# Patient Record
Sex: FEMALE | URBAN - METROPOLITAN AREA
[De-identification: names, ages, dates, MRNs, and addresses within clinical notes are randomized per-mention and may not be internally consistent; named-entity substitution may affect disease eponyms.]

---

## 2017-07-05 DIAGNOSIS — Z11.1 SCREENING EXAMINATION FOR PULMONARY TUBERCULOSIS: Primary | ICD-10-CM

## 2017-07-06 LAB
M TB TUBERC IFN-G BLD QL: NEGATIVE
M TB TUBERC IFN-G/MITOGEN IGNF BLD: 0 IU/ML

## 2017-10-19 ENCOUNTER — APPOINTMENT (OUTPATIENT)
Dept: GENERAL RADIOLOGY | Facility: CLINIC | Age: 42
End: 2017-10-19
Attending: FAMILY MEDICINE
Payer: COMMERCIAL

## 2017-10-19 ENCOUNTER — APPOINTMENT (OUTPATIENT)
Dept: CARDIOLOGY | Facility: CLINIC | Age: 42
End: 2017-10-19
Attending: FAMILY MEDICINE
Payer: COMMERCIAL

## 2017-10-19 ENCOUNTER — HOSPITAL ENCOUNTER (EMERGENCY)
Facility: CLINIC | Age: 42
Discharge: HOME | End: 2017-10-19
Attending: FAMILY MEDICINE | Admitting: FAMILY MEDICINE
Payer: COMMERCIAL

## 2017-10-19 VITALS
RESPIRATION RATE: 16 BRPM | SYSTOLIC BLOOD PRESSURE: 94 MMHG | DIASTOLIC BLOOD PRESSURE: 60 MMHG | OXYGEN SATURATION: 97 %

## 2017-10-19 DIAGNOSIS — R07.89 ATYPICAL CHEST PAIN: ICD-10-CM

## 2017-10-19 LAB
ALBUMIN SERPL-MCNC: 3.9 G/DL (ref 3.4–5)
ALBUMIN UR-MCNC: NEGATIVE MG/DL
ALP SERPL-CCNC: 48 U/L (ref 40–150)
ALT SERPL W P-5'-P-CCNC: 22 U/L (ref 0–50)
ANION GAP SERPL CALCULATED.3IONS-SCNC: 4 MMOL/L (ref 3–14)
APPEARANCE UR: CLEAR
APTT PPP: 26 SEC (ref 22–37)
AST SERPL W P-5'-P-CCNC: 12 U/L (ref 0–45)
BASOPHILS # BLD AUTO: 0 10E9/L (ref 0–0.2)
BASOPHILS NFR BLD AUTO: 0.3 %
BILIRUB SERPL-MCNC: 0.8 MG/DL (ref 0.2–1.3)
BILIRUB UR QL STRIP: NEGATIVE
BUN SERPL-MCNC: 12 MG/DL (ref 7–30)
CALCIUM SERPL-MCNC: 8.9 MG/DL (ref 8.5–10.1)
CHLORIDE SERPL-SCNC: 104 MMOL/L (ref 94–109)
CO2 SERPL-SCNC: 32 MMOL/L (ref 20–32)
COLOR UR AUTO: NORMAL
CREAT SERPL-MCNC: 0.77 MG/DL (ref 0.52–1.04)
DIFFERENTIAL METHOD BLD: NORMAL
EOSINOPHIL # BLD AUTO: 0.1 10E9/L (ref 0–0.7)
EOSINOPHIL NFR BLD AUTO: 1.4 %
ERYTHROCYTE [DISTWIDTH] IN BLOOD BY AUTOMATED COUNT: 12 % (ref 10–15)
GFR SERPL CREATININE-BSD FRML MDRD: 83 ML/MIN/1.7M2
GLUCOSE SERPL-MCNC: 107 MG/DL (ref 70–99)
GLUCOSE UR STRIP-MCNC: NEGATIVE MG/DL
HCG UR QL: NEGATIVE
HCT VFR BLD AUTO: 40.8 % (ref 35–47)
HGB BLD-MCNC: 14.4 G/DL (ref 11.7–15.7)
HGB UR QL STRIP: NEGATIVE
IMM GRANULOCYTES # BLD: 0 10E9/L (ref 0–0.4)
IMM GRANULOCYTES NFR BLD: 0.1 %
INR PPP: 0.95 (ref 0.86–1.14)
INTERPRETATION ECG - MUSE: NORMAL
KETONES UR STRIP-MCNC: NEGATIVE MG/DL
LEUKOCYTE ESTERASE UR QL STRIP: NEGATIVE
LIPASE SERPL-CCNC: 155 U/L (ref 73–393)
LYMPHOCYTES # BLD AUTO: 2.2 10E9/L (ref 0.8–5.3)
LYMPHOCYTES NFR BLD AUTO: 32.1 %
MCH RBC QN AUTO: 30 PG (ref 26.5–33)
MCHC RBC AUTO-ENTMCNC: 35.3 G/DL (ref 31.5–36.5)
MCV RBC AUTO: 85 FL (ref 78–100)
MONOCYTES # BLD AUTO: 0.6 10E9/L (ref 0–1.3)
MONOCYTES NFR BLD AUTO: 8.5 %
NEUTROPHILS # BLD AUTO: 4 10E9/L (ref 1.6–8.3)
NEUTROPHILS NFR BLD AUTO: 57.6 %
NITRATE UR QL: NEGATIVE
NRBC # BLD AUTO: 0 10*3/UL
NRBC BLD AUTO-RTO: 0 /100
NT-PROBNP SERPL-MCNC: 24 PG/ML (ref 0–450)
PH UR STRIP: 6 PH (ref 5–7)
PLATELET # BLD AUTO: 379 10E9/L (ref 150–450)
POTASSIUM SERPL-SCNC: 4.1 MMOL/L (ref 3.4–5.3)
PROT SERPL-MCNC: 7.1 G/DL (ref 6.8–8.8)
RBC # BLD AUTO: 4.8 10E12/L (ref 3.8–5.2)
SODIUM SERPL-SCNC: 140 MMOL/L (ref 133–144)
SOURCE: NORMAL
SP GR UR STRIP: 1.01 (ref 1–1.03)
TROPONIN I SERPL-MCNC: <0.015 UG/L (ref 0–0.04)
TROPONIN I SERPL-MCNC: <0.015 UG/L (ref 0–0.04)
UROBILINOGEN UR STRIP-MCNC: NORMAL MG/DL (ref 0–2)
WBC # BLD AUTO: 6.9 10E9/L (ref 4–11)

## 2017-10-19 PROCEDURE — 85730 THROMBOPLASTIN TIME PARTIAL: CPT | Performed by: FAMILY MEDICINE

## 2017-10-19 PROCEDURE — 99285 EMERGENCY DEPT VISIT HI MDM: CPT | Mod: 25 | Performed by: FAMILY MEDICINE

## 2017-10-19 PROCEDURE — 93306 TTE W/DOPPLER COMPLETE: CPT | Mod: 26 | Performed by: INTERNAL MEDICINE

## 2017-10-19 PROCEDURE — 71010 XR CHEST PORT 1 VW: CPT

## 2017-10-19 PROCEDURE — 93010 ELECTROCARDIOGRAM REPORT: CPT | Mod: Z6 | Performed by: FAMILY MEDICINE

## 2017-10-19 PROCEDURE — 96375 TX/PRO/DX INJ NEW DRUG ADDON: CPT | Performed by: FAMILY MEDICINE

## 2017-10-19 PROCEDURE — 93005 ELECTROCARDIOGRAM TRACING: CPT | Performed by: FAMILY MEDICINE

## 2017-10-19 PROCEDURE — 96374 THER/PROPH/DIAG INJ IV PUSH: CPT | Performed by: FAMILY MEDICINE

## 2017-10-19 PROCEDURE — 83880 ASSAY OF NATRIURETIC PEPTIDE: CPT | Performed by: FAMILY MEDICINE

## 2017-10-19 PROCEDURE — 99284 EMERGENCY DEPT VISIT MOD MDM: CPT | Mod: 25 | Performed by: FAMILY MEDICINE

## 2017-10-19 PROCEDURE — 25000132 ZZH RX MED GY IP 250 OP 250 PS 637: Performed by: FAMILY MEDICINE

## 2017-10-19 PROCEDURE — 96361 HYDRATE IV INFUSION ADD-ON: CPT | Performed by: FAMILY MEDICINE

## 2017-10-19 PROCEDURE — 80053 COMPREHEN METABOLIC PANEL: CPT | Performed by: FAMILY MEDICINE

## 2017-10-19 PROCEDURE — 83690 ASSAY OF LIPASE: CPT | Performed by: FAMILY MEDICINE

## 2017-10-19 PROCEDURE — 93005 ELECTROCARDIOGRAM TRACING: CPT | Mod: 76 | Performed by: FAMILY MEDICINE

## 2017-10-19 PROCEDURE — 25000128 H RX IP 250 OP 636: Performed by: FAMILY MEDICINE

## 2017-10-19 PROCEDURE — 25000132 ZZH RX MED GY IP 250 OP 250 PS 637

## 2017-10-19 PROCEDURE — 84484 ASSAY OF TROPONIN QUANT: CPT | Performed by: FAMILY MEDICINE

## 2017-10-19 PROCEDURE — 85610 PROTHROMBIN TIME: CPT | Performed by: FAMILY MEDICINE

## 2017-10-19 PROCEDURE — 93306 TTE W/DOPPLER COMPLETE: CPT

## 2017-10-19 PROCEDURE — 81003 URINALYSIS AUTO W/O SCOPE: CPT | Performed by: FAMILY MEDICINE

## 2017-10-19 PROCEDURE — 81025 URINE PREGNANCY TEST: CPT | Performed by: FAMILY MEDICINE

## 2017-10-19 PROCEDURE — 85025 COMPLETE CBC W/AUTO DIFF WBC: CPT | Performed by: FAMILY MEDICINE

## 2017-10-19 RX ORDER — ACETAMINOPHEN 500 MG
1000 TABLET ORAL ONCE
Status: COMPLETED | OUTPATIENT
Start: 2017-10-19 | End: 2017-10-19

## 2017-10-19 RX ORDER — ACETAMINOPHEN 500 MG
TABLET ORAL
Status: COMPLETED
Start: 2017-10-19 | End: 2017-10-19

## 2017-10-19 RX ORDER — ONDANSETRON 2 MG/ML
4 INJECTION INTRAMUSCULAR; INTRAVENOUS
Status: COMPLETED | OUTPATIENT
Start: 2017-10-19 | End: 2017-10-19

## 2017-10-19 RX ORDER — ASPIRIN 81 MG/1
162 TABLET, CHEWABLE ORAL ONCE
Status: COMPLETED | OUTPATIENT
Start: 2017-10-19 | End: 2017-10-19

## 2017-10-19 RX ORDER — LIDOCAINE 40 MG/G
CREAM TOPICAL
Status: DISCONTINUED | OUTPATIENT
Start: 2017-10-19 | End: 2017-10-19 | Stop reason: HOSPADM

## 2017-10-19 RX ORDER — KETOROLAC TROMETHAMINE 30 MG/ML
15 INJECTION, SOLUTION INTRAMUSCULAR; INTRAVENOUS ONCE
Status: COMPLETED | OUTPATIENT
Start: 2017-10-19 | End: 2017-10-19

## 2017-10-19 RX ORDER — SODIUM CHLORIDE 9 MG/ML
1000 INJECTION, SOLUTION INTRAVENOUS CONTINUOUS
Status: DISCONTINUED | OUTPATIENT
Start: 2017-10-19 | End: 2017-10-19 | Stop reason: HOSPADM

## 2017-10-19 RX ADMIN — ONDANSETRON 4 MG: 2 INJECTION INTRAMUSCULAR; INTRAVENOUS at 08:19

## 2017-10-19 RX ADMIN — ASPIRIN 81 MG CHEWABLE TABLET 162 MG: 81 TABLET CHEWABLE at 08:18

## 2017-10-19 RX ADMIN — KETOROLAC TROMETHAMINE 15 MG: 30 INJECTION, SOLUTION INTRAMUSCULAR at 09:08

## 2017-10-19 RX ADMIN — Medication 1000 MG: at 08:19

## 2017-10-19 RX ADMIN — ACETAMINOPHEN 1000 MG: 500 TABLET ORAL at 08:19

## 2017-10-19 RX ADMIN — SODIUM CHLORIDE 1000 ML: 9 INJECTION, SOLUTION INTRAVENOUS at 08:21

## 2017-10-19 ASSESSMENT — ENCOUNTER SYMPTOMS
DIFFICULTY URINATING: 0
DYSPHORIC MOOD: 0
WEAKNESS: 1
NAUSEA: 1
FEVER: 0
FATIGUE: 1
BACK PAIN: 0
ABDOMINAL PAIN: 0
EYE REDNESS: 0
ACTIVITY CHANGE: 1
DECREASED CONCENTRATION: 0
COUGH: 0
COLOR CHANGE: 0
WOUND: 0
BRUISES/BLEEDS EASILY: 0
VOMITING: 0
NECK STIFFNESS: 0
CONFUSION: 0
SHORTNESS OF BREATH: 0
HEADACHES: 1
ARTHRALGIAS: 0

## 2017-10-19 NOTE — ED PROVIDER NOTES
History     Chief Complaint   Patient presents with     Chest Pain     pain/ chest tightness started while driving, nausea, radiated to left shoulder and arm     HPI  Lali Morrow is a 42 year old female who just emergency room with chest pressure with left arm discomfort.  She was known to myself.  She is a resident here at the [facility].  Patient no history of heart disease etc.  Driving and also developed a tightness in her chest with some left shoulder discomfort.  No shortness of breath but did have nausea.  No abdominal pain no back pain.  No fevers or chills no leg pain or leg swelling now just feels weak but other symptoms are gone.  Symptoms lasted approximately 5 minutes.  He noted that she's had a couple episodes in the past and been very brief several been evaluated for these.  Family history mother history of heart issues.  Patient otherwise no smoking no hypertension diabetes cholesterol patient only with thyroid issues.    I have reviewed the Medications, Allergies, Past Medical and Surgical History, and Social History in the Epic system.    Review of Systems   Constitutional: Positive for activity change and fatigue. Negative for fever.   HENT: Negative for congestion.    Eyes: Negative for redness.   Respiratory: Negative for cough and shortness of breath.    Cardiovascular: Negative for chest pain and leg swelling.   Gastrointestinal: Positive for nausea. Negative for abdominal pain and vomiting.   Genitourinary: Negative for difficulty urinating.   Musculoskeletal: Negative for arthralgias, back pain and neck stiffness.   Skin: Negative for color change, rash and wound.   Allergic/Immunologic: Negative for immunocompromised state.   Neurological: Positive for weakness (mild general) and headaches. Negative for syncope.   Hematological: Does not bruise/bleed easily.   Psychiatric/Behavioral: Negative for confusion, decreased concentration and dysphoric mood.   All other systems reviewed  and are negative.      Physical Exam   BP: 116/74  Heart Rate: 84  Resp: 20  SpO2: 98 %      Physical Exam   Constitutional: She is oriented to person, place, and time. She appears well-developed and well-nourished. She appears distressed.   Patient  no chest pain.  Just feeling weak.   HENT:   Head: Normocephalic and atraumatic.   Eyes: EOM are normal. Pupils are equal, round, and reactive to light. No scleral icterus.   Neck: Normal range of motion. Neck supple. No JVD present.   Cardiovascular: Normal rate and regular rhythm.    Pulmonary/Chest: No stridor. No respiratory distress.   Abdominal: She exhibits no distension. There is no tenderness. There is no rebound and no guarding.   Musculoskeletal: She exhibits no edema, tenderness or deformity.   Negative Homans   Neurological: She is alert and oriented to person, place, and time. She has normal reflexes. No cranial nerve deficit. Coordination normal.   Skin: Skin is warm and dry. No rash noted. She is not diaphoretic. No erythema. No pallor.   Psychiatric: She has a normal mood and affect. Her behavior is normal. Judgment and thought content normal.   Nursing note and vitals reviewed.      ED Course     ED Course     Patient evaluated in ER quickly.  EKG shows sinus rhythm there is just some subtle ST changes inferior that aren't hyperacute.  Patient given 2 baby aspirin and Tylenol orally.  Chest x-ray done was normal.  Patient just states she feels weak but having no chest discomfort.  Patient describes headache without neuro changes.  We'll give 15 mg of Toradol IV as she is request.  Chest x-ray done as noted was normal.  Troponin ×2 negative.  Patient feeling better.  Discussed as far as doing stress echo patient declines this we did do a bedside echo noted that appear to be normal.    I had cardiology fellow look at both EKGs as we did do a repeat one also there is no significant change service for cardiology.    Patient feels much better did eat here  also in the ER is symptom-free comfortable going home to follow-up with M.D.    Procedures             EKG Interpretation:      Interpreted by Jerrod Gonzales  Time reviewed: 800  Symptoms at time of EKG: chest pain   Rhythm: normal sinus   Rate: normal  Axis: normal  Ectopy: none  Conduction: normal  ST Segments/ T Waves: No hyperacute ST-T wave changes  Q Waves: none  Comparison to prior: No old EKG available    Clinical Impression: normal EKG            Critical Care time:  none           Labs Ordered and Resulted from Time of ED Arrival Up to the Time of Departure from the ED   COMPREHENSIVE METABOLIC PANEL - Abnormal; Notable for the following:        Result Value    Glucose 107 (*)     All other components within normal limits   CBC WITH PLATELETS DIFFERENTIAL   INR   PARTIAL THROMBOPLASTIN TIME   LIPASE   TROPONIN I   NT PROBNP INPATIENT   UA MACROSCOPIC WITH REFLEX TO MICRO AND CULTURE   HCG QUALITATIVE URINE   TROPONIN I   PULSE OXIMETRY NURSING   CARDIAC CONTINUOUS MONITORING   PERIPHERAL IV CATHETER     Results for orders placed or performed during the hospital encounter of 10/19/17   XR Chest Port 1 View    Narrative    XR CHEST PORT 1 VW 10/19/2017 8:32 AM    HISTORY: Chest pain.    COMPARISON: 10/18/2016    FINDINGS: No airspace consolidation, pleural effusion or pneumothorax.  Normal heart size.      Impression    IMPRESSION: No acute cardiopulmonary abnormality.    ANGY CALHOUN MD   CBC with platelets differential   Result Value Ref Range    WBC 6.9 4.0 - 11.0 10e9/L    RBC Count 4.80 3.8 - 5.2 10e12/L    Hemoglobin 14.4 11.7 - 15.7 g/dL    Hematocrit 40.8 35.0 - 47.0 %    MCV 85 78 - 100 fl    MCH 30.0 26.5 - 33.0 pg    MCHC 35.3 31.5 - 36.5 g/dL    RDW 12.0 10.0 - 15.0 %    Platelet Count 379 150 - 450 10e9/L    Diff Method Automated Method     % Neutrophils 57.6 %    % Lymphocytes 32.1 %    % Monocytes 8.5 %    % Eosinophils 1.4 %    % Basophils 0.3 %    % Immature Granulocytes 0.1 %     Nucleated RBCs 0 0 /100    Absolute Neutrophil 4.0 1.6 - 8.3 10e9/L    Absolute Lymphocytes 2.2 0.8 - 5.3 10e9/L    Absolute Monocytes 0.6 0.0 - 1.3 10e9/L    Absolute Eosinophils 0.1 0.0 - 0.7 10e9/L    Absolute Basophils 0.0 0.0 - 0.2 10e9/L    Abs Immature Granulocytes 0.0 0 - 0.4 10e9/L    Absolute Nucleated RBC 0.0    INR   Result Value Ref Range    INR 0.95 0.86 - 1.14   Partial thromboplastin time   Result Value Ref Range    PTT 26 22 - 37 sec   Comprehensive metabolic panel   Result Value Ref Range    Sodium 140 133 - 144 mmol/L    Potassium 4.1 3.4 - 5.3 mmol/L    Chloride 104 94 - 109 mmol/L    Carbon Dioxide 32 20 - 32 mmol/L    Anion Gap 4 3 - 14 mmol/L    Glucose 107 (H) 70 - 99 mg/dL    Urea Nitrogen 12 7 - 30 mg/dL    Creatinine 0.77 0.52 - 1.04 mg/dL    GFR Estimate 83 >60 mL/min/1.7m2    GFR Estimate If Black >90 >60 mL/min/1.7m2    Calcium 8.9 8.5 - 10.1 mg/dL    Bilirubin Total 0.8 0.2 - 1.3 mg/dL    Albumin 3.9 3.4 - 5.0 g/dL    Protein Total 7.1 6.8 - 8.8 g/dL    Alkaline Phosphatase 48 40 - 150 U/L    ALT 22 0 - 50 U/L    AST 12 0 - 45 U/L   Lipase   Result Value Ref Range    Lipase 155 73 - 393 U/L   Troponin I   Result Value Ref Range    Troponin I ES <0.015 0.000 - 0.045 ug/L   Nt probnp inpatient (BNP)   Result Value Ref Range    N-Terminal Pro BNP Inpatient 24 0 - 450 pg/mL   UA reflex to Microscopic and Culture   Result Value Ref Range    Color Urine Light Yellow     Appearance Urine Clear     Glucose Urine Negative NEG^Negative mg/dL    Bilirubin Urine Negative NEG^Negative    Ketones Urine Negative NEG^Negative mg/dL    Specific Gravity Urine 1.006 1.003 - 1.035    Blood Urine Negative NEG^Negative    pH Urine 6.0 5.0 - 7.0 pH    Protein Albumin Urine Negative NEG^Negative mg/dL    Urobilinogen mg/dL Normal 0.0 - 2.0 mg/dL    Nitrite Urine Negative NEG^Negative    Leukocyte Esterase Urine Negative NEG^Negative    Source Midstream Urine    HCG qualitative urine   Result Value Ref Range     HCG Qual Urine Negative NEG^Negative   Troponin I   Result Value Ref Range    Troponin I ES <0.015 0.000 - 0.045 ug/L   EKG 12 lead   Result Value Ref Range    Interpretation ECG Click View Image link to view waveform and result    Echocardiogram Complete    Narrative    [id number]  ECH19  [id number]  [id number]^EREN^DIXIE^PEDRO LUIS           [facility],[place]  Echocardiography Laboratory  [address] [place] [address]     Name: RICARDO LEMA  MRN: [id number]  : 1975  Study Date: 10/19/2017 09:15 AM  Age: 42 yrs  Gender: Female  Patient Location: [facility]  Reason For Study: Chest Discomfort  Ordering Physician: DIXIE JASON  Performed By: Margo Layton     BSA: 1.6 m2  Height: 61 in  Weight: 136 lb  BP: 130/77 mmHg  _____________________________________________________________________________  __        Procedure  Complete Portable Echo Adult.  _____________________________________________________________________________  __        Interpretation Summary  Left ventricular size is normal. Global and regional left ventricular function  is normal with an EF of 60-65%.  The right ventricle is normal size. Global right ventricular function is  normal.  The inferior vena cava is normal. Estimated mean right atrial pressure is 3  mmHg.  No pericardial effusion is present.  Previous study not available for comparison.  _____________________________________________________________________________  __        Left Ventricle  Left ventricular size is normal. Left ventricular wall thickness is normal.  Global and regional left ventricular function is normal with an EF of 60-65%.  Normal left ventricular filling for age. No regional wall motion abnormalities  are seen.     Right Ventricle  The right ventricle is normal size. Global right ventricular function is  normal.     Atria  Both atria appear normal. The atrial septum is intact as assessed by color  Doppler .        Mitral  Valve  The mitral valve is normal. Trace mitral insufficiency is present.     Aortic Valve  Aortic valve is normal in structure and function.     Tricuspid Valve  The tricuspid valve is normal. Trace tricuspid insufficiency is present.  Pulmonary artery systolic pressure cannot be assessed. The peak velocity of  the tricuspid regurgitant jet is not obtainable.     Pulmonic Valve  The pulmonic valve is normal.     Vessels  The aorta root is normal. The inferior vena cava is normal. Estimated mean  right atrial pressure is 3 mmHg.     Pericardium  No pericardial effusion is present.        Compared to Previous Study  Previous study not available for comparison.  _____________________________________________________________________________  __     MMode/2D Measurements & Calculations  IVSd: 0.79 cm  LVIDd: 4.5 cm  LVIDs: 3.0 cm  LVPWd: 0.76 cm  FS: 33.4 %  EDV(Teich): 93.2 ml  ESV(Teich): 35.3 ml  LV mass(C)d: 109.8 grams  LV mass(C)dI: 68.5 grams/m2  Ao root diam: 2.3 cm  asc Aorta Diam: 2.8 cm  LVOT diam: 1.7 cm  LVOT area: 2.3 cm2  LA Volume (BP): 40.0 ml  LA Volume Index (BP): 25.0 ml/m2     TAPSE: 2.5 cm        Doppler Measurements & Calculations  MV E max raquel: 75.5 cm/sec  MV A max raquel: 50.3 cm/sec  MV E/A: 1.5  MV dec time: 0.23 sec  Lateral E/e': 5.7  Med E to E': 6.1  Medial E/e': 6.1     _____________________________________________________________________________  __           Report approved by: Kimber Alarcon 10/19/2017 10:41 AM                 Assessments & Plan (with Medical Decision Making)  42-year-old female known to myself presented with a brief 5-10 minute episode of chest pain with some left arm discomfort.  Patient feeling generally weak was evaluated here in the ER laboratory testing including troponin ×2 are negative.  Bedside echocardiogram was normal also.  EKGs ×2 reviewed by cardiology fellow field no significant findings noted.  She declines stress echo is okay going home following up  with M.D.  Currently symptom-free feels much better.             I have reviewed the nursing notes.    I have reviewed the findings, diagnosis, plan and need for follow up with the patient.    Discharge Medication List as of 10/19/2017 12:04 PM          Final diagnoses:   Atypical chest pain       10/19/2017   [facility], [place], EMERGENCY DEPARTMENT    This note was created at least in part by the use of dragon voice dictation system. Inadvertent typographical errors may still exist.  Jerrod Gonzales MD.         Jerrod Gonzales MD  10/19/17 [id number]

## 2017-10-19 NOTE — DISCHARGE INSTRUCTIONS
Home.  Rest today.  Your ekg's and echo and labs look good.  Monitor symptoms.  See MD for follow up.  Return if any concerns.  Results for orders placed or performed during the hospital encounter of 10/19/17   XR Chest Port 1 View    Narrative    XR CHEST PORT 1 VW 10/19/2017 8:32 AM    HISTORY: Chest pain.    COMPARISON: 10/18/2016    FINDINGS: No airspace consolidation, pleural effusion or pneumothorax.  Normal heart size.      Impression    IMPRESSION: No acute cardiopulmonary abnormality.    ANGY CALHOUN MD   CBC with platelets differential   Result Value Ref Range    WBC 6.9 4.0 - 11.0 10e9/L    RBC Count 4.80 3.8 - 5.2 10e12/L    Hemoglobin 14.4 11.7 - 15.7 g/dL    Hematocrit 40.8 35.0 - 47.0 %    MCV 85 78 - 100 fl    MCH 30.0 26.5 - 33.0 pg    MCHC 35.3 31.5 - 36.5 g/dL    RDW 12.0 10.0 - 15.0 %    Platelet Count 379 150 - 450 10e9/L    Diff Method Automated Method     % Neutrophils 57.6 %    % Lymphocytes 32.1 %    % Monocytes 8.5 %    % Eosinophils 1.4 %    % Basophils 0.3 %    % Immature Granulocytes 0.1 %    Nucleated RBCs 0 0 /100    Absolute Neutrophil 4.0 1.6 - 8.3 10e9/L    Absolute Lymphocytes 2.2 0.8 - 5.3 10e9/L    Absolute Monocytes 0.6 0.0 - 1.3 10e9/L    Absolute Eosinophils 0.1 0.0 - 0.7 10e9/L    Absolute Basophils 0.0 0.0 - 0.2 10e9/L    Abs Immature Granulocytes 0.0 0 - 0.4 10e9/L    Absolute Nucleated RBC 0.0    INR   Result Value Ref Range    INR 0.95 0.86 - 1.14   Partial thromboplastin time   Result Value Ref Range    PTT 26 22 - 37 sec   Comprehensive metabolic panel   Result Value Ref Range    Sodium 140 133 - 144 mmol/L    Potassium 4.1 3.4 - 5.3 mmol/L    Chloride 104 94 - 109 mmol/L    Carbon Dioxide 32 20 - 32 mmol/L    Anion Gap 4 3 - 14 mmol/L    Glucose 107 (H) 70 - 99 mg/dL    Urea Nitrogen 12 7 - 30 mg/dL    Creatinine 0.77 0.52 - 1.04 mg/dL    GFR Estimate 83 >60 mL/min/1.7m2    GFR Estimate If Black >90 >60 mL/min/1.7m2    Calcium 8.9 8.5 - 10.1 mg/dL    Bilirubin Total  0.8 0.2 - 1.3 mg/dL    Albumin 3.9 3.4 - 5.0 g/dL    Protein Total 7.1 6.8 - 8.8 g/dL    Alkaline Phosphatase 48 40 - 150 U/L    ALT 22 0 - 50 U/L    AST 12 0 - 45 U/L   Lipase   Result Value Ref Range    Lipase 155 73 - 393 U/L   Troponin I   Result Value Ref Range    Troponin I ES <0.015 0.000 - 0.045 ug/L   Nt probnp inpatient (BNP)   Result Value Ref Range    N-Terminal Pro BNP Inpatient 24 0 - 450 pg/mL   UA reflex to Microscopic and Culture   Result Value Ref Range    Color Urine Light Yellow     Appearance Urine Clear     Glucose Urine Negative NEG^Negative mg/dL    Bilirubin Urine Negative NEG^Negative    Ketones Urine Negative NEG^Negative mg/dL    Specific Gravity Urine 1.006 1.003 - 1.035    Blood Urine Negative NEG^Negative    pH Urine 6.0 5.0 - 7.0 pH    Protein Albumin Urine Negative NEG^Negative mg/dL    Urobilinogen mg/dL Normal 0.0 - 2.0 mg/dL    Nitrite Urine Negative NEG^Negative    Leukocyte Esterase Urine Negative NEG^Negative    Source Midstream Urine    HCG qualitative urine   Result Value Ref Range    HCG Qual Urine Negative NEG^Negative   Troponin I   Result Value Ref Range    Troponin I ES <0.015 0.000 - 0.045 ug/L   EKG 12 lead   Result Value Ref Range    Interpretation ECG Click View Image link to view waveform and result    Echocardiogram Complete    Narrative    [id number]  ECH19  [id number]  [id number]^EREN^DIXIE^PEDRO LUIS           [facility],[place]  Echocardiography Laboratory  [address] [place] [id number]     Name: RICARDO LEMA  MRN: [id number]  : 1975  Study Date: 10/19/2017 09:15 AM  Age: 42 yrs  Gender: Female  Patient Location: [facility], [place]  Reason For Study: Chest Discomfort  Ordering Physician: DIXIE JASON  Performed By: Margo Layton     BSA: 1.6 m2  Height: 61 in  Weight: 136 lb  BP: 130/77 mmHg  _____________________________________________________________________________  __        Procedure  Complete Portable Echo  Adult.  _____________________________________________________________________________  __        Interpretation Summary  Left ventricular size is normal. Global and regional left ventricular function  is normal with an EF of 60-65%.  The right ventricle is normal size. Global right ventricular function is  normal.  The inferior vena cava is normal. Estimated mean right atrial pressure is 3  mmHg.  No pericardial effusion is present.  Previous study not available for comparison.  _____________________________________________________________________________  __        Left Ventricle  Left ventricular size is normal. Left ventricular wall thickness is normal.  Global and regional left ventricular function is normal with an EF of 60-65%.  Normal left ventricular filling for age. No regional wall motion abnormalities  are seen.     Right Ventricle  The right ventricle is normal size. Global right ventricular function is  normal.     Atria  Both atria appear normal. The atrial septum is intact as assessed by color  Doppler .        Mitral Valve  The mitral valve is normal. Trace mitral insufficiency is present.     Aortic Valve  Aortic valve is normal in structure and function.     Tricuspid Valve  The tricuspid valve is normal. Trace tricuspid insufficiency is present.  Pulmonary artery systolic pressure cannot be assessed. The peak velocity of  the tricuspid regurgitant jet is not obtainable.     Pulmonic Valve  The pulmonic valve is normal.     Vessels  The aorta root is normal. The inferior vena cava is normal. Estimated mean  right atrial pressure is 3 mmHg.     Pericardium  No pericardial effusion is present.        Compared to Previous Study  Previous study not available for comparison.  _____________________________________________________________________________  __     MMode/2D Measurements & Calculations  IVSd: 0.79 cm  LVIDd: 4.5 cm  LVIDs: 3.0 cm  LVPWd: 0.76 cm  FS: 33.4 %  EDV(Teich): 93.2 ml  ESV(Teich):  35.3 ml  LV mass(C)d: 109.8 grams  LV mass(C)dI: 68.5 grams/m2  Ao root diam: 2.3 cm  asc Aorta Diam: 2.8 cm  LVOT diam: 1.7 cm  LVOT area: 2.3 cm2  LA Volume (BP): 40.0 ml  LA Volume Index (BP): 25.0 ml/m2     TAPSE: 2.5 cm        Doppler Measurements & Calculations  MV E max raquel: 75.5 cm/sec  MV A max raquel: 50.3 cm/sec  MV E/A: 1.5  MV dec time: 0.23 sec  Lateral E/e': 5.7  Med E to E': 6.1  Medial E/e': 6.1     _____________________________________________________________________________  __           Report approved by: Kimber Alarcon 10/19/2017 10:41 AM

## 2017-10-19 NOTE — ED AVS SNAPSHOT
[facility], [place], Emergency Department    [address]    [facility] [place] [address]    Phone:  [phone]    Fax:  [phone]                                       Lali Morrow   MRN: [id number]    Department:  [facility], Emergency Department   Date of Visit:  10/19/2017           After Visit Summary Signature Page     I have received my discharge instructions, and my questions have been answered. I have discussed any challenges I see with this plan with the nurse or doctor.    ..........................................................................................................................................  Patient/Patient Representative Signature      ..........................................................................................................................................  Patient Representative Print Name and Relationship to Patient    ..................................................               ................................................  Date                                            Time    ..........................................................................................................................................  Reviewed by Signature/Title    ...................................................              ..............................................  Date                                                            Time

## 2017-10-19 NOTE — ED AVS SNAPSHOT
[facility], Emergency Department    [address]    [facility] [place] [place] [address]    Phone:  [phone]    Fax:  [phone]                                       Lali Morrow   MRN: [id number]    Department:  [facility], Emergency Department   Date of Visit:  10/19/2017           Patient Information     Date Of Birth          1975        Your diagnoses for this visit were:     Atypical chest pain        You were seen by Jerrod Gonzales MD.      Follow-up Information     Follow up with Vanessa Moeller MD.    Specialty:  Family Practice    Contact information:    [address] [address] [phone]          Discharge Instructions       Home.  Rest today.  Your ekg's and echo and labs look good.  Monitor symptoms.  See MD for follow up.  Return if any concerns.  Results for orders placed or performed during the hospital encounter of 10/19/17   XR Chest Port 1 View    Narrative    XR CHEST PORT 1 VW 10/19/2017 8:32 AM    HISTORY: Chest pain.    COMPARISON: 10/18/2016    FINDINGS: No airspace consolidation, pleural effusion or pneumothorax.  Normal heart size.      Impression    IMPRESSION: No acute cardiopulmonary abnormality.    ANGY CALHOUN MD   CBC with platelets differential   Result Value Ref Range    WBC 6.9 4.0 - 11.0 10e9/L    RBC Count 4.80 3.8 - 5.2 10e12/L    Hemoglobin 14.4 11.7 - 15.7 g/dL    Hematocrit 40.8 35.0 - 47.0 %    MCV 85 78 - 100 fl    MCH 30.0 26.5 - 33.0 pg    MCHC 35.3 31.5 - 36.5 g/dL    RDW 12.0 10.0 - 15.0 %    Platelet Count 379 150 - 450 10e9/L    Diff Method Automated Method     % Neutrophils 57.6 %    % Lymphocytes 32.1 %    % Monocytes 8.5 %    % Eosinophils 1.4 %    % Basophils 0.3 %    % Immature Granulocytes 0.1 %    Nucleated RBCs 0 0 /100    Absolute Neutrophil 4.0 1.6 - 8.3 10e9/L    Absolute Lymphocytes 2.2 0.8 - 5.3 10e9/L    Absolute Monocytes 0.6 0.0 - 1.3 10e9/L    Absolute Eosinophils 0.1 0.0 - 0.7 10e9/L    Absolute  Basophils 0.0 0.0 - 0.2 10e9/L    Abs Immature Granulocytes 0.0 0 - 0.4 10e9/L    Absolute Nucleated RBC 0.0    INR   Result Value Ref Range    INR 0.95 0.86 - 1.14   Partial thromboplastin time   Result Value Ref Range    PTT 26 22 - 37 sec   Comprehensive metabolic panel   Result Value Ref Range    Sodium 140 133 - 144 mmol/L    Potassium 4.1 3.4 - 5.3 mmol/L    Chloride 104 94 - 109 mmol/L    Carbon Dioxide 32 20 - 32 mmol/L    Anion Gap 4 3 - 14 mmol/L    Glucose 107 (H) 70 - 99 mg/dL    Urea Nitrogen 12 7 - 30 mg/dL    Creatinine 0.77 0.52 - 1.04 mg/dL    GFR Estimate 83 >60 mL/min/1.7m2    GFR Estimate If Black >90 >60 mL/min/1.7m2    Calcium 8.9 8.5 - 10.1 mg/dL    Bilirubin Total 0.8 0.2 - 1.3 mg/dL    Albumin 3.9 3.4 - 5.0 g/dL    Protein Total 7.1 6.8 - 8.8 g/dL    Alkaline Phosphatase 48 40 - 150 U/L    ALT 22 0 - 50 U/L    AST 12 0 - 45 U/L   Lipase   Result Value Ref Range    Lipase 155 73 - 393 U/L   Troponin I   Result Value Ref Range    Troponin I ES <0.015 0.000 - 0.045 ug/L   Nt probnp inpatient (BNP)   Result Value Ref Range    N-Terminal Pro BNP Inpatient 24 0 - 450 pg/mL   UA reflex to Microscopic and Culture   Result Value Ref Range    Color Urine Light Yellow     Appearance Urine Clear     Glucose Urine Negative NEG^Negative mg/dL    Bilirubin Urine Negative NEG^Negative    Ketones Urine Negative NEG^Negative mg/dL    Specific Gravity Urine 1.006 1.003 - 1.035    Blood Urine Negative NEG^Negative    pH Urine 6.0 5.0 - 7.0 pH    Protein Albumin Urine Negative NEG^Negative mg/dL    Urobilinogen mg/dL Normal 0.0 - 2.0 mg/dL    Nitrite Urine Negative NEG^Negative    Leukocyte Esterase Urine Negative NEG^Negative    Source Midstream Urine    HCG qualitative urine   Result Value Ref Range    HCG Qual Urine Negative NEG^Negative   Troponin I   Result Value Ref Range    Troponin I ES <0.015 0.000 - 0.045 ug/L   EKG 12 lead   Result Value Ref Range    Interpretation ECG Click View Image link to view  waveform and result    Echocardiogram Complete    Narrative    [id number]  [facility]19  [id number]  [id number]^EREN^DIXIE^PEDRO LUIS           [facility]  Echocardiography Laboratory  [address] [place] [id number]     Name: RICARDO LEMA  MRN: [id number]  : 1975  Study Date: 10/19/2017 09:15 AM  Age: 42 yrs  Gender: Female  Patient Location: [facility]  Reason For Study: Chest Discomfort  Ordering Physician: DIXIE JASON  Performed By: Margo Layton     BSA: 1.6 m2  Height: 61 in  Weight: 136 lb  BP: 130/77 mmHg  _____________________________________________________________________________  __        Procedure  Complete Portable Echo Adult.  _____________________________________________________________________________  __        Interpretation Summary  Left ventricular size is normal. Global and regional left ventricular function  is normal with an EF of 60-65%.  The right ventricle is normal size. Global right ventricular function is  normal.  The inferior vena cava is normal. Estimated mean right atrial pressure is 3  mmHg.  No pericardial effusion is present.  Previous study not available for comparison.  _____________________________________________________________________________  __        Left Ventricle  Left ventricular size is normal. Left ventricular wall thickness is normal.  Global and regional left ventricular function is normal with an EF of 60-65%.  Normal left ventricular filling for age. No regional wall motion abnormalities  are seen.     Right Ventricle  The right ventricle is normal size. Global right ventricular function is  normal.     Atria  Both atria appear normal. The atrial septum is intact as assessed by color  Doppler .        Mitral Valve  The mitral valve is normal. Trace mitral insufficiency is present.     Aortic Valve  Aortic valve is normal in structure and function.     Tricuspid Valve  The tricuspid valve is normal. Trace tricuspid  insufficiency is present.  Pulmonary artery systolic pressure cannot be assessed. The peak velocity of  the tricuspid regurgitant jet is not obtainable.     Pulmonic Valve  The pulmonic valve is normal.     Vessels  The aorta root is normal. The inferior vena cava is normal. Estimated mean  right atrial pressure is 3 mmHg.     Pericardium  No pericardial effusion is present.        Compared to Previous Study  Previous study not available for comparison.  _____________________________________________________________________________  __     MMode/2D Measurements & Calculations  IVSd: 0.79 cm  LVIDd: 4.5 cm  LVIDs: 3.0 cm  LVPWd: 0.76 cm  FS: 33.4 %  EDV(Teich): 93.2 ml  ESV(Teich): 35.3 ml  LV mass(C)d: 109.8 grams  LV mass(C)dI: 68.5 grams/m2  Ao root diam: 2.3 cm  asc Aorta Diam: 2.8 cm  LVOT diam: 1.7 cm  LVOT area: 2.3 cm2  LA Volume (BP): 40.0 ml  LA Volume Index (BP): 25.0 ml/m2     TAPSE: 2.5 cm        Doppler Measurements & Calculations  MV E max raquel: 75.5 cm/sec  MV A max raquel: 50.3 cm/sec  MV E/A: 1.5  MV dec time: 0.23 sec  Lateral E/e': 5.7  Med E to E': 6.1  Medial E/e': 6.1     _____________________________________________________________________________  __           Report approved by: Kimber Alarcon 10/19/2017 10:41 AM              24 Hour Appointment Hotline       To make an appointment at any [facility], call [phone] ([phone]). If you don't have a family doctor or clinic, we will help you find one. [facility] clinics are conveniently located to serve the needs of you and your family.             Review of your medicines      Our records show that you are taking the medicines listed below. If these are incorrect, please call your family doctor or clinic.        Dose / Directions Last dose taken    LEVOTHYROXINE SODIUM PO   Dose:  75 mcg        Take 75 mcg by mouth daily   Refills:  0                Procedures and tests performed during your visit     Procedure/Test Number of Times  "Performed    CBC with platelets differential 1    Cardiac Continuous Monitoring 1    Comprehensive metabolic panel 1    EKG 12 lead 2    Echocardiogram Complete 1    HCG qualitative urine 1    INR 1    Lipase 1    Nt probnp inpatient (BNP) 1    Partial thromboplastin time 1    Peripheral IV catheter 1    Pulse oximetry nursing 1    Troponin I 2    UA reflex to Microscopic and Culture 1    XR Chest Port 1 View 1      Orders Needing Specimen Collection     None      Pending Results     Date and Time Order Name Status Description    10/19/2017 0759 EKG 12 lead Preliminary             Pending Culture Results     No orders found from 10/17/2017 to 10/20/2017.            Pending Results Instructions     If you had any lab results that were not finalized at the time of your Discharge, you can call the ED Lab Result RN at [phone]. You will be contacted by this team for any positive Lab results or changes in treatment. The nurses are available 7 days a week from 10A to 6:30P.  You can leave a message 24 hours per day and they will return your call.        Thank you for choosing [facility]       Thank you for choosing [facility] for your care. Our goal is always to provide you with excellent care. Hearing back from our patients is one way we can continue to improve our services. Please take a few minutes to complete the written survey that you may receive in the mail after you visit with us. Thank you!        [employer] Information     [employer] lets you send messages to your doctor, view your test results, renew your prescriptions, schedule appointments and more. To sign up, go to [email or web address] . Click on \"Log in\" on the left side of the screen, which will take you to the Welcome page. Then click on \"Sign up Now\" on the right side of the page.     You will be asked to enter the access code listed below, as well as some personal information. Please follow the directions to create your username and password.     Your " access code is: [email or web address]  Expires: 2018 12:03 PM     Your access code will  in 90 days. If you need help or a new code, please call your [place] clinic or [phone].        Care EveryWhere ID     This is your Care EveryWhere ID. This could be used by other organizations to access your [facility] [place] medical records  [id number]        Equal Access to Services     [facility] Center[facility] : Hadii eden moodyo Socony, waaxda luqadaha, qaybta kaalmada ademarisel, jax ward . So [facility] [phone].    ATENCIÓN: Si habla davidañol, tiene a ferrari disposición servicios gratuitos de asistencia lingüística. Llame al [phone].    We comply with applicable federal civil rights laws and [place] laws. We do not discriminate on the basis of race, color, national origin, age, disability, sex, sexual orientation, or gender identity.            After Visit Summary       This is your record. Keep this with you and show to your community pharmacist(s) and doctor(s) at your next visit.

## 2017-10-21 LAB — INTERPRETATION ECG - MUSE: NORMAL

## 2017-10-22 ENCOUNTER — HEALTH MAINTENANCE LETTER (OUTPATIENT)
Age: 42
End: 2017-10-22

## 2017-11-24 ENCOUNTER — DOCUMENTATION ONLY (OUTPATIENT)
Dept: FAMILY MEDICINE | Facility: CLINIC | Age: 42
End: 2017-11-24

## 2017-11-24 DIAGNOSIS — E66.3 OVERWEIGHT (BMI 25.0-29.9): Primary | ICD-10-CM

## 2017-11-24 RX ORDER — PHENTERMINE HYDROCHLORIDE 15 MG/1
15 CAPSULE ORAL EVERY MORNING
Qty: 30 CAPSULE | Refills: 0 | Status: SHIPPED | OUTPATIENT
Start: 2017-11-24 | End: 2019-03-01

## 2017-11-24 NOTE — PROGRESS NOTES
Discussion with patient about struggle with weight loss. Hx of hypothyroidism, on Levothyroxine. Had recent labs at an Integrative medicine clinic and is currently euthyroid. Trying to modify eating, is doing yoga and working out but is not losing weight. Additional stressors at home discussed including challenges in marriage. Does not feel that she is depressed but is very saddened by what is happening in her relationship with her spouse and the implications of her 3 kids. 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 Lots of external pressures on her. Has considered whether she might benefit from an SSRI but does not feel she has a mood disorder.     Reviewed options and decided together on trial of Phentermine 15 mg qd over then next 1-3 months. Will monitor weight/BMI and for mood symptoms in interim.    Vanessa Moeller MD

## 2017-12-04 ENCOUNTER — TELEPHONE (OUTPATIENT)
Dept: FAMILY MEDICINE | Facility: CLINIC | Age: 42
End: 2017-12-04

## 2017-12-04 NOTE — TELEPHONE ENCOUNTER
Central Prior Authorization Team   Phone: [phone]    PA Initiation    Medication: phentermine 15 MG capsule  Insurance Company: [employer] ([facility]) - Phone [phone] Fax [phone]  Pharmacy Filling the Rx: [facility] [address] [place] [address] JEANCARLOS MILLER AT [facility]  Filling Pharmacy Phone: [phone]  Filling Pharmacy Fax: [phone]  Start Date: 12/4/2017

## 2017-12-07 NOTE — TELEPHONE ENCOUNTER
Chief complaint:   Chief Complaint   Patient presents with   • Back Pain     lower back pain going down right side    • Knee Pain     right knee pain        Vitals:  Visit Vitals   • /81   • Pulse 66   • Ht 5' 3\" (1.6 m)   • Wt 99.8 kg   • LMP 11/12/2007 (Approximate)   • BMI 38.97 kg/m2       HISTORY OF PRESENT ILLNESS     HPI     Referring Physician: Dr. Bradford Flemingaine is a 67 year old female who suffers from lumbar spondylosis, radiculopathy and right knee pain.      She would like to address her right knee pain first.  Pain started in 01/2005, with no mechanism of injury.  Marielos had previously been treated by previous pain clinic [facility] by Dr. Huff.  Patient reports that Lumbar RFA, Lumbar JOSE and synvisc injections were effective in managing pain.  She reports great relief of pain from previous lumbar RFA however she is unsure of when this was completed. She had self discharged herself from clinic as it was too difficult to make an appointment and get in quickly for procedures.  Records were reviewed which was consistent with report.  She is not interested in medications at this time and would like to schedule for Lumbar RFA.     Pain is described as: Numb, Tiring, Comes and Goes, Shooting, Deep, Punishing/Cruel and Throbbing.   Pain is pain worse: with activity.    Pain is alleviated with: medications and heat.      Patient reports:    - Weakness with her right lower extremity.    Patient Denies:    -Numbness, Tingling, loss of bowel and bladder control related to pain, persistent/recurrent fever, chills, night sweats, weight loss    Pain has affected:    -Sleep, daily activities, work    Pain Intensity:    - Pain today or at the moment: 6  - Least pain on average day: 5  - Worst pain on average day: 8  - Your overall average pain: 6    Previous treatments tried:    - Physical therapy  - Chiropractor  - Injections:  Previously with Dr. Huff.  Patient has Lumbar RFA, Lumbar JOSE and Synvisc injections  Insurance states PA is still under review.  Unsure of exact date of determination. Will follow up next week.   completed with great relief of pain  - TENS Units    Medications:     - Norco  - Tramadol  - Tizandine  - Voltaren    She has not tried:     Counseling for pain  Biofeedback  Hypnosis  Acupuncture  Herbal Therapies  Surgeries for pain    Other significant problems:  Patient Active Problem List    Diagnosis Date Noted   • Encounter for long-term (current) use of medications 01/26/2017     Priority: Low   • Macromastia 12/13/2016     Priority: Low   • Mitral regurgitation - S/p clipping 09/27/2016     Priority: Low   • Positional vertigo 02/23/2016     Priority: Low   • Constipation due to pain medication 02/23/2016     Priority: Low   • Chronic neck pain 11/03/2015     Priority: Low   • Insomnia 08/27/2015     Priority: Low   • Osteoarthritis, generalized 06/16/2015     Priority: Low   • Hyperlipidemia 06/16/2015     Priority: Low   • Knee pain, bilateral 06/17/2014     Priority: Low   • Hypokalemia 07/23/2013     Priority: Low   • Renal insufficiency 07/11/2012     Priority: Low   • Menopausal symptoms 07/11/2012     Priority: Low   • Hypertension 06/05/2012     Priority: Low   • Cardiomyopathy 06/05/2012     Priority: Low   • Chronic low back pain 06/05/2012     Priority: Low   • Sciatica 06/05/2012     Priority: Low   • GERD (gastroesophageal reflux disease) 06/05/2012     Priority: Low   • Asthma 06/05/2012     Priority: Low   • Environmental allergies 06/05/2012     Priority: Low   • Vitamin D deficiency 06/05/2012     Priority: Low   • Constipation 06/05/2012     Priority: Low   • ISIDRO (obstructive sleep apnea) 06/05/2012     Priority: Low   • At risk for falls      Priority: Low   • Pain      Priority: Low       PAST MEDICAL, FAMILY AND SOCIAL HISTORY     Medications:  Current Outpatient Prescriptions   Medication   • lidocaine (LIDODERM) 5 % patch   • allopurinol (ZYLOPRIM) 300 MG tablet   • atorvastatin (LIPITOR) 20 MG tablet   • furosemide (LASIX) 40 MG tablet   • potassium chloride (K-DUR,KLOR-CON) 10 MEQ  CR tablet   • omeprazole (PRILOSEC) 20 MG capsule   • carvedilol (COREG) 12.5 MG tablet   • diclofenac (VOLTAREN) 50 MG EC tablet   • losartan-hydrochlorothiazide (HYZAAR) 100-25 MG per tablet   • albuterol 108 (90 BASE) MCG/ACT inhaler   • tiZANidine (ZANAFLEX) 4 MG tablet   • Prenatal Vit-DSS-Fe Fum-FA (SE-BALTAZAR 19) 29-1 MG Tab   • polyethylene glycol (MIRALAX) powder   • albuterol (VENTOLIN) (2.5 MG/3ML) 0.083% nebulizer solution   • vitamin E 400 UNIT capsule   • aspirin 81 MG tablet     No current facility-administered medications for this visit.        Allergies:  ALLERGIES:   Allergen Reactions   • Fentanyl SHORTNESS OF BREATH     Chest pain.    • Latex RASH       Past Medical  History/Surgeries:  Past Medical History:   Diagnosis Date   • Arterial ischemic stroke, vertebrobasilar, brainstem, remote, resolved    • Cerebral infarction    • Esophageal reflux    • Family history of malignant neoplasm of gastrointestinal tract 4/15/2013    Colonoscopy/Nick   • Generalized headaches    • Lumbago    • Mitral regurgitation     severe symptomatic, mitral clip   • Murmur    • Osteoarthrosis, unspecified whether generalized or localized, unspecified site    • Other and unspecified mitral valve diseases    • Other chronic pain     LBP   • Other primary cardiomyopathies    • Sleep apnea     No c-pap   • Unspecified asthma(493.90)    • Unspecified essential hypertension        Past Surgical History:   Procedure Laterality Date   • BREAST REDUCTION SURGERY  2016    bilateral reduction - Dr Jewell   • CARDIAC SURGERY     • COLOREC CANC SCRN,COLONOSCOPY NOT HI RISK  4/15/2013    Dr. Romero/Colorectal Screening/recall 4/15/2018   • IR EPIDURAL INJECTION      Pain management   • MITRAL VALVE REPAIR  2010    mitral clip       Family History:  Family History   Problem Relation Age of Onset   • Heart Mother    • Hypertension Mother    • Hypertension Father    • Heart Father    • Breast cancer Sister    • Cancer  Brother    • Breast cancer Maternal Aunt    • Cancer Paternal Aunt        Social History:  Social History   Substance Use Topics   • Smoking status: Former Smoker     Packs/day: 1.00     Types: Cigarettes     Quit date: 1/1/2005   • Smokeless tobacco: Never Used      Comment: pt quit 15-20 yrs ago   • Alcohol use Yes      Comment: occasionally, 1 time week       REVIEW OF SYSTEMS     Review of Systems   Constitutional: Positive for activity change, appetite change and unexpected weight change. Negative for chills, diaphoresis, fatigue and fever.   HENT: Positive for sinus pressure and tinnitus. Negative for ear discharge, ear pain and hearing loss.    Eyes: Positive for visual disturbance. Negative for photophobia.   Respiratory: Negative for chest tightness and shortness of breath.    Cardiovascular: Negative for chest pain.   Gastrointestinal: Negative for abdominal pain, constipation, nausea and vomiting.        Denies loss of bowel control   Genitourinary: Negative for difficulty urinating, dysuria and hematuria.        Denies loss of bladder control   Musculoskeletal: Positive for arthralgias, back pain and gait problem.   Skin: Negative for color change and rash.   Neurological: Positive for dizziness and weakness. Negative for light-headedness, numbness and headaches.   Hematological: Does not bruise/bleed easily.   Psychiatric/Behavioral: Positive for dysphoric mood and sleep disturbance. Negative for self-injury and suicidal ideas.       PHYSICAL EXAM     Physical Exam   Constitutional: She is oriented to person, place, and time. She appears well-developed and well-nourished. No distress.   HENT:   Head: Normocephalic and atraumatic.   Eyes: Pupils are equal, round, and reactive to light. No scleral icterus.   Neck: Neck supple. No thyromegaly present.   Cardiovascular:   Pulses:       Dorsalis pedis pulses are 2+ on the right side, and 2+ on the left side.   Negative calf tenderness bilaterally     Pulmonary/Chest: Effort normal. No respiratory distress.   Abdominal: Soft. She exhibits no mass. There is no tenderness. There is no guarding.   Musculoskeletal:        Lumbar back: She exhibits decreased range of motion, tenderness and pain.   Pain with lumbar spinal extension and lateral rotation to the right and left   Neurological: She is alert and oriented to person, place, and time. She has normal strength. No cranial nerve deficit. She exhibits normal muscle tone.   Reflex Scores:       Patellar reflexes are 2+ on the right side and 2+ on the left side.       Achilles reflexes are 2+ on the right side and 2+ on the left side.  - SLR BLE's    Skin: No rash noted. She is not diaphoretic.   Psychiatric: She has a normal mood and affect. Her behavior is normal.       Diagnostic Data:     Lab Results   Component Value Date    WBC 4.8 12/05/2016    RBC 4.45 12/05/2016    HCT 38.6 12/05/2016    HGB 12.9 12/05/2016     12/05/2016    SODIUM 144 01/26/2017    POTASSIUM 3.9 01/26/2017    CO2 29 01/26/2017    CHLORIDE 106 01/26/2017    BUN 16 01/26/2017    CREATININE 0.98 (H) 01/26/2017    GLUCOSE 85 01/26/2017    INR 1.1 06/10/2013    PTT 26 06/10/2013     Results for orders placed in visit on 08/22/16   MRI LUMBAR SPINE    Impression IMPRESSION:  1.  Multilevel degenerative disc changes lower thoracic and lumbar spine.  2.  Moderate right L2-L3 neural foraminal narrowing.  3.  Mild to moderate central spinal canal stenosis L3-L4.  Moderate  left-sided neural foraminal encroachment L3-L4  4.  Grade 1 anterolisthesis L4-L5.  5.  Moderate central spinal canal stenosis L4-L5.  Moderate left L4-L5  neural foraminal encroachment.         ASSESSMENT/PLAN     Lumbar spondylosis, L3-4, L4-5  Moderate central spinal canal stenosis L3-L4, L4-L5  Moderate left sided neural foraminal enrochment L3-L4, L4-L5  Right knee pain, osteoarthritis  Previously at [facility] and Dr. Huff's clinic.  Self-discharge    Co-Morbidities: GERD,  Renal insufficiency, Asthma        - Patient was referred by Dr. Felder for pharmacological and interventional treatment in managing pain  - Reviewed diagnostic imaging including: MRI of the Lumbar spine, XR of the right knee  - Will address Lumbar spine pain first then treat right knee pain  - Will ordered MRI of the right knee to rule out meniscal tear  - Discussed condition, pharmacological, interventional treatment (Lumbar diagnostic facet injections, Lumbar RFA) in managing pain  - Per patient she has failed conservative management: PT, NSAIDS, OPIOIDS, MUSCLE RELAXANTS in managing pain  - Uses NSAIDS sparingly as she has GERD  - Previous pain management notes that were sent  did not include date of last lumbar diagnostic facet injection or RFA and levels.    - Will obtain authorization for bilateral lumbar diagnostic facet injection at the L3-4 and L4-5 level  - She refuses pharmacological management at this time  - Continue HEP as tolerated  - She will f/u with Dr. Andrea for interventional treatment as listed above. If she has any questions she was instructed to call our office.     Anna Marie March NP    Thank you for your kind referral Dr. Felder.  If there are any questions please contact our office at [phone]    New Prescriptions    No medications on file        Modified Medications    No medications on file

## 2017-12-11 NOTE — TELEPHONE ENCOUNTER
PRIOR AUTHORIZATION DENIED    Medication: phentermine 15 MG capsule-Denied-    Denial Date: 12/10/2017    Denial Rational: Patient did not meet the plan requirements. Coverage can only be approved if:         Appeal Information: If you would like to appeal this decision we would need a letter of medical necessity in order to begin the appeal process. The sooner the letter is written, and we are notified the letter is in the patient's chart, the sooner we can get the appeal initiated. Some appeals can take up to 30 days to be completed.

## 2017-12-14 NOTE — TELEPHONE ENCOUNTER
Denied Reason: Pt does not meet requirements per insurance    Alternatives: Non available    Pharmacy notified.  Routing to MD    Please advise if you would like to move forward with the appeal process or plan to  prescribe an alternative medication. If Appeal is desired a letter of medical necessity with denial rationale is needed to start the appeal process.   Route to PA Pool when completed.    Iman Hewitt CMA

## 2018-02-07 DIAGNOSIS — Z30.015 ENCOUNTER FOR INITIAL PRESCRIPTION OF VAGINAL RING HORMONAL CONTRACEPTIVE: Primary | ICD-10-CM

## 2018-02-07 RX ORDER — ETONOGESTREL AND ETHINYL ESTRADIOL VAGINAL RING .015; .12 MG/D; MG/D
RING VAGINAL
Qty: 3 EACH | Refills: 3 | Status: SHIPPED | OUTPATIENT
Start: 2018-02-07 | End: 2019-03-01

## 2019-03-01 ENCOUNTER — OFFICE VISIT (OUTPATIENT)
Dept: INTERNAL MEDICINE | Facility: CLINIC | Age: 44
End: 2019-03-01
Attending: INTERNAL MEDICINE
Payer: COMMERCIAL

## 2019-03-01 VITALS
SYSTOLIC BLOOD PRESSURE: 109 MMHG | HEIGHT: 61 IN | DIASTOLIC BLOOD PRESSURE: 71 MMHG | BODY MASS INDEX: 24.49 KG/M2 | WEIGHT: 129.7 LBS | HEART RATE: 71 BPM

## 2019-03-01 DIAGNOSIS — E06.3 HASHIMOTO'S THYROIDITIS: Primary | ICD-10-CM

## 2019-03-01 LAB
ERYTHROCYTE [DISTWIDTH] IN BLOOD BY AUTOMATED COUNT: 11.9 % (ref 10–15)
HCT VFR BLD AUTO: 43.1 % (ref 35–47)
HGB BLD-MCNC: 14.6 G/DL (ref 11.7–15.7)
MCH RBC QN AUTO: 30.1 PG (ref 26.5–33)
MCHC RBC AUTO-ENTMCNC: 33.9 G/DL (ref 31.5–36.5)
MCV RBC AUTO: 89 FL (ref 78–100)
PLATELET # BLD AUTO: 396 10E9/L (ref 150–450)
RBC # BLD AUTO: 4.85 10E12/L (ref 3.8–5.2)
VIT B12 SERPL-MCNC: 284 PG/ML (ref 193–986)
WBC # BLD AUTO: 6.3 10E9/L (ref 4–11)

## 2019-03-01 PROCEDURE — 82607 VITAMIN B-12: CPT | Performed by: INTERNAL MEDICINE

## 2019-03-01 PROCEDURE — 83921 ORGANIC ACID SINGLE QUANT: CPT | Performed by: INTERNAL MEDICINE

## 2019-03-01 PROCEDURE — 85027 COMPLETE CBC AUTOMATED: CPT | Performed by: INTERNAL MEDICINE

## 2019-03-01 PROCEDURE — 36415 COLL VENOUS BLD VENIPUNCTURE: CPT | Performed by: INTERNAL MEDICINE

## 2019-03-01 PROCEDURE — G0463 HOSPITAL OUTPT CLINIC VISIT: HCPCS | Mod: ZF

## 2019-03-01 RX ORDER — LIOTHYRONINE SODIUM 5 UG/1
TABLET ORAL
COMMUNITY
Start: 2018-08-21

## 2019-03-01 SDOH — SOCIAL STABILITY: SOCIAL INSECURITY
WITHIN THE LAST YEAR, HAVE YOU BEEN KICKED, HIT, SLAPPED, OR OTHERWISE PHYSICALLY HURT BY YOUR PARTNER OR EX-PARTNER?: NO

## 2019-03-01 SDOH — SOCIAL STABILITY: SOCIAL INSECURITY: WITHIN THE LAST YEAR, HAVE YOU BEEN HUMILIATED OR EMOTIONALLY ABUSED IN OTHER WAYS BY YOUR PARTNER OR EX-PARTNER?: NO

## 2019-03-01 SDOH — SOCIAL STABILITY: SOCIAL INSECURITY: WITHIN THE LAST YEAR, HAVE YOU BEEN AFRAID OF YOUR PARTNER OR EX-PARTNER?: NO

## 2019-03-01 SDOH — SOCIAL STABILITY: SOCIAL INSECURITY
WITHIN THE LAST YEAR, HAVE TO BEEN RAPED OR FORCED TO HAVE ANY KIND OF SEXUAL ACTIVITY BY YOUR PARTNER OR EX-PARTNER?: NO

## 2019-03-01 ASSESSMENT — ANXIETY QUESTIONNAIRES
6. BECOMING EASILY ANNOYED OR IRRITABLE: NOT AT ALL
3. WORRYING TOO MUCH ABOUT DIFFERENT THINGS: NOT AT ALL
2. NOT BEING ABLE TO STOP OR CONTROL WORRYING: NOT AT ALL
1. FEELING NERVOUS, ANXIOUS, OR ON EDGE: NOT AT ALL
5. BEING SO RESTLESS THAT IT IS HARD TO SIT STILL: NOT AT ALL
7. FEELING AFRAID AS IF SOMETHING AWFUL MIGHT HAPPEN: NOT AT ALL
GAD7 TOTAL SCORE: 0

## 2019-03-01 ASSESSMENT — MIFFLIN-ST. JEOR: SCORE: 1190.7

## 2019-03-01 ASSESSMENT — PATIENT HEALTH QUESTIONNAIRE - PHQ9
5. POOR APPETITE OR OVEREATING: NOT AT ALL
SUM OF ALL RESPONSES TO PHQ QUESTIONS 1-9: 2

## 2019-03-01 NOTE — LETTER
3/1/2019       RE: Lali Morrow  [address]   [facility] [address]     Dear Colleague,    Thank you for referring your patient, Lali Morrow, to the [facility]  at [facility]. Please see a copy of my visit note below.    HPI  Patient is here to address possible diagnosis of pernicious anemia. She reports that she was diagnosed with Hashimoto thyroiditis when she was 30. She had an episode of epigastric pain. She was advised at the time that her biopsy was positive for parietal cell antibodies (on the biopsy). She has B12 level tested and was found to have low-normal values. She was advised to start B12 injections. [place] was 34. She was on B12 injections at the time, her provider placed a diagnosis of pernicious anemia to justify ongoing injections. She has stopped B12 injections in  because of very high levels of B12. She was subsequently tested for B12 levels and has had normal levels in  and .     Current Outpatient Medications   Medication     levonorgestrel (MIRENA) 20 MCG/24HR IUD     LEVOTHYROXINE SODIUM PO     liothyronine (CYTOMEL) 5 MCG tablet     No current facility-administered medications for this visit.      Past Medical History:   Diagnosis Date     Hashimoto's disease      Past Surgical History:   Procedure Laterality Date     ABDOMEN SURGERY       APPENDECTOMY        SECTION       ENT SURGERY      tonsillectomy     Family History   Problem Relation Age of Onset     Heart Disease Mother      Depression Mother      Social History     Socioeconomic History     Marital status:      Spouse name: Not on file     Number of children: Not on file     Years of education: Not on file     Highest education level: Not on file   Occupational History     Not on file   Social Needs     Financial resource strain: Not on file     Food insecurity:     Worry: Not on file     Inability: Not on file      Transportation needs:     Medical: Not on file     Non-medical: Not on file   Tobacco Use     Smoking status: Never Smoker     Smokeless tobacco: Never Used   Substance and Sexual Activity     Alcohol use: No     Drug use: No     Sexual activity: Yes     Partners: Male     Birth control/protection: IUD     Comment: Mirena inserted 07/30/2018   Lifestyle     Physical activity:     Days per week: Not on file     Minutes per session: Not on file     Stress: Not on file   Relationships     Social connections:     Talks on phone: Not on file     Gets together: Not on file     Attends [employer] service: Not on file     Active member of club or organization: Not on file     Attends meetings of clubs or organizations: Not on file     Relationship status: Not on file     Intimate partner violence:     Fear of current or ex partner: No     Emotionally abused: No     Physically abused: No     Forced sexual activity: No   Other Topics Concern     Not on file   Social History Narrative     Not on file       Vitals:    03/01/19 0847 03/01/19 0848 03/01/19 0849 03/01/19 0850   BP: 111/74 113/72 104/67 109/71   BP Location:    Left arm   Patient Position:    Chair   Pulse:    71   Weight:       Height:         Physical Exam   Constitutional: She is oriented to person, place, and time. She appears well-developed and well-nourished.   HENT:   Head: Normocephalic and atraumatic.   Eyes: EOM are normal. Pupils are equal, round, and reactive to light.   Neck: Normal range of motion. Neck supple.   Cardiovascular: Normal rate, regular rhythm, normal heart sounds and intact distal pulses.   Pulmonary/Chest: Effort normal and breath sounds normal. No stridor. No respiratory distress. She has no wheezes. She has no rales. She exhibits no tenderness.   Abdominal: Soft. Bowel sounds are normal.   Musculoskeletal: Normal range of motion. She exhibits no edema.   Neurological: She is alert and oriented to person, place, and time. She  displays normal reflexes. No cranial nerve deficit. She exhibits normal muscle tone.   Skin: Skin is warm and dry.   Psychiatric: She has a normal mood and affect. Her behavior is normal. Judgment and thought content normal.   Nursing note and vitals reviewed.    Assessment and Plan:  Lali was seen today for establish care.    Diagnoses and all orders for this visit:    Hashimoto's thyroiditis. Patient has established diagnosis of Hashimoto thyroiditis and has been on stable dose of thyroid replacement. Hashimoto thyroiditis can occur as an isolated autoimmune endocrinopathy and can occur in conjunction with other autoimmune disorders. Patient has not demonstrated clinical signs or symptoms of other conditions. Her biopsy report from [facility] with parietal cell antibodies is not available for review. However, parietal cell antibodies cannot be directly linked to pernicious anemia as only 12% of patients with these antibodies meet criteria for pernicious anemia. Patient's laboratory data from previous visits in the [place] were reviewed. She had acute blood loss anemia following , however, at other times her hemoglobin and MCV were normal. Therefore, patient is not meeting criteria for pernicious anemia. Recommend checking CBC today. Will also check B12 and methylmalonic acid level to exclude B12 deficiency in non-anemic state. If both levels are normal, the diagnosis of pernicious anemia cannot be considered valid.       -     Vitamin B12  -     CBC with Platelets  -     Methylmalonic acid    Total time spent 45  minutes.  More than 50% of the time spent with Ms. Morrow on counseling / coordinating her care    Diana Munoz MD

## 2019-03-01 NOTE — NURSING NOTE
Chief Complaint   Patient presents with     Establish Care     Hashimoto       See ELIAS Pate 3/1/2019

## 2019-03-01 NOTE — LETTER
3/19/2019         Lali Morrow   [address] [facility] [address]        Dear Ms. Morrow:    Your CBC was normal.     Results for orders placed or performed in visit on 03/01/19   Vitamin B12   Result Value Ref Range    Vitamin B12 284 193 - 986 pg/mL   CBC with Platelets   Result Value Ref Range    WBC 6.3 4.0 - 11.0 10e9/L    RBC Count 4.85 3.8 - 5.2 10e12/L    Hemoglobin 14.6 11.7 - 15.7 g/dL    Hematocrit 43.1 35.0 - 47.0 %    MCV 89 78 - 100 fl    MCH 30.1 26.5 - 33.0 pg    MCHC 33.9 31.5 - 36.5 g/dL    RDW 11.9 10.0 - 15.0 %    Platelet Count 396 150 - 450 10e9/L   Methylmalonic acid   Result Value Ref Range    Methylmalonic Acid 0.20 0.00 - 0.40 umol/L         Please note that test explanations are brief and do not reflect all diagnostic uses.  If you have any questions or concerns, please call the clinic at [phone].      Sincerely,      Raegan Kevin sent on behalf of  Diana Munoz MD

## 2019-03-01 NOTE — LETTER
3/19/2019         Lali Morrow   [address] [facility] [address]        Dear Ms. Morrow:    Your labs were reviewed by Diana Munoz MD and are within acceptable ranges.  No changes are recommended.     Results for orders placed or performed in visit on 03/01/19   Vitamin B12   Result Value Ref Range    Vitamin B12 284 193 - 986 pg/mL   CBC with Platelets   Result Value Ref Range    WBC 6.3 4.0 - 11.0 10e9/L    RBC Count 4.85 3.8 - 5.2 10e12/L    Hemoglobin 14.6 11.7 - 15.7 g/dL    Hematocrit 43.1 35.0 - 47.0 %    MCV 89 78 - 100 fl    MCH 30.1 26.5 - 33.0 pg    MCHC 33.9 31.5 - 36.5 g/dL    RDW 11.9 10.0 - 15.0 %    Platelet Count 396 150 - 450 10e9/L   Methylmalonic acid   Result Value Ref Range    Methylmalonic Acid 0.20 0.00 - 0.40 umol/L         Please note that test explanations are brief and do not reflect all diagnostic uses.  If you have any questions or concerns, please call the clinic at [phone].      Sincerely,      Raegan Kevin sent on behalf of  Diana Munoz MD

## 2019-03-01 NOTE — PROGRESS NOTES
HPI  Patient is here to address possible diagnosis of pernicious anemia. She reports that she was diagnosed with Hashimoto thyroiditis when she was 30. She had an episode of epigastric pain. She was advised at the time that her biopsy was positive for parietal cell antibodies (on the biopsy). She has B12 level tested and was found to have low-normal values. She was advised to start B12 injections. [place] came [place] was 34. She was on B12 injections at the time, her provider placed a diagnosis of pernicious anemia to justify ongoing injections. She has stopped B12 injections in 2014 because of very high levels of B12. She was subsequently tested for B12 levels and has had normal levels in 2015 and 2016.     Review of Systems     Constitutional:  Negative for fever, chills, fatigue, decreased appetite and night sweats.   HENT:  Negative for dry mouth.    Eyes:  Negative for decreased vision and eye watering.   Respiratory:   Negative for cough and dyspnea on exertion.    Cardiovascular:  Negative for chest pain, dyspnea on exertion and edema.   Gastrointestinal:  Negative for nausea, vomiting, abdominal pain, diarrhea, constipation and melena.   Genitourinary:  Negative for flank pain and genital sores.   Musculoskeletal:  Negative for back pain, arthralgias and bone pain.   Skin:  Negative for itching and rash.   Neurological:  Negative for dizziness, numbness and paralysis.   Endo/Heme:  Negative for anemia, swollen glands and bruises/bleeds easily.   Psychiatric/Behavioral:  Negative for depression, decreased concentration, mood swings and panic attacks.    Breast:  Negative for breast discharge, breast mass, breast pain and nipple retraction.   Endocrine:  Negative for altered temperature regulation, polyphagia, polydipsia, unwanted hair growth and change in facial hair.    Current Outpatient Medications   Medication     levonorgestrel (MIRENA) 20 MCG/24HR IUD     LEVOTHYROXINE SODIUM PO     liothyronine  (CYTOMEL) 5 MCG tablet     No current facility-administered medications for this visit.      Past Medical History:   Diagnosis Date     Hashimoto's disease      Past Surgical History:   Procedure Laterality Date     ABDOMEN SURGERY       APPENDECTOMY        SECTION       ENT SURGERY      tonsillectomy     Family History   Problem Relation Age of Onset     Heart Disease Mother      Depression Mother      Social History     Socioeconomic History     Marital status:      Spouse name: Not on file     Number of children: Not on file     Years of education: Not on file     Highest education level: Not on file   Occupational History     Not on file   Social Needs     Financial resource strain: Not on file     Food insecurity:     Worry: Not on file     Inability: Not on file     Transportation needs:     Medical: Not on file     Non-medical: Not on file   Tobacco Use     Smoking status: Never Smoker     Smokeless tobacco: Never Used   Substance and Sexual Activity     Alcohol use: No     Drug use: No     Sexual activity: Yes     Partners: Male     Birth control/protection: IUD     Comment: Mirena inserted 2018   Lifestyle     Physical activity:     Days per week: Not on file     Minutes per session: Not on file     Stress: Not on file   Relationships     Social connections:     Talks on phone: Not on file     Gets together: Not on file     Attends [employer] service: Not on file     Active member of club or organization: Not on file     Attends meetings of clubs or organizations: Not on file     Relationship status: Not on file     Intimate partner violence:     Fear of current or ex partner: No     Emotionally abused: No     Physically abused: No     Forced sexual activity: No   Other Topics Concern     Not on file   Social History Narrative     Not on file       Vitals:    19 0847 19 0848 19 0849 19 0850   BP: 111/74 113/72 104/67 109/71   BP Location:    Left arm   Patient  Position:    Chair   Pulse:    71   Weight:       Height:         Physical Exam   Constitutional: She is oriented to person, place, and time. She appears well-developed and well-nourished.   HENT:   Head: Normocephalic and atraumatic.   Eyes: EOM are normal. Pupils are equal, round, and reactive to light.   Neck: Normal range of motion. Neck supple.   Cardiovascular: Normal rate, regular rhythm, normal heart sounds and intact distal pulses.   Pulmonary/Chest: Effort normal and breath sounds normal. No stridor. No respiratory distress. She has no wheezes. She has no rales. She exhibits no tenderness.   Abdominal: Soft. Bowel sounds are normal.   Musculoskeletal: Normal range of motion. She exhibits no edema.   Neurological: She is alert and oriented to person, place, and time. She displays normal reflexes. No cranial nerve deficit. She exhibits normal muscle tone.   Skin: Skin is warm and dry.   Psychiatric: She has a normal mood and affect. Her behavior is normal. Judgment and thought content normal.   Nursing note and vitals reviewed.    Assessment and Plan:  Lali was seen today for establish care.    Diagnoses and all orders for this visit:    Hashimoto's thyroiditis. Patient has established diagnosis of Hashimoto thyroiditis and has been on stable dose of thyroid replacement. Hashimoto thyroiditis can occur as an isolated autoimmune endocrinopathy and can occur in conjunction with other autoimmune disorders. Patient has not demonstrated clinical signs or symptoms of other conditions. Her biopsy report from [facility] with parietal cell antibodies is not available for review. However, parietal cell antibodies cannot be directly linked to pernicious anemia as only 12% of patients with these antibodies meet criteria for pernicious anemia. Patient's laboratory data from previous visits in the [place] were reviewed. She had acute blood loss anemia following , however, at other times her hemoglobin and MCV were  normal. Therefore, patient is not meeting criteria for pernicious anemia. Recommend checking CBC today. Will also check B12 and methylmalonic acid level to exclude B12 deficiency in non-anemic state. If both levels are normal, the diagnosis of pernicious anemia cannot be considered valid.       -     Vitamin B12  -     CBC with Platelets  -     Methylmalonic acid      Total time spent 45  minutes.  More than 50% of the time spent with Ms. Morrow on counseling / coordinating her care    Diana Munoz MD

## 2019-03-02 ASSESSMENT — ENCOUNTER SYMPTOMS
DIZZINESS: 0
NERVOUS/ANXIOUS: 0
NUMBNESS: 0
SWOLLEN GLANDS: 0
NAUSEA: 0
FLANK PAIN: 0
BRUISES/BLEEDS EASILY: 0
DIARRHEA: 0
CHILLS: 0
ALTERED TEMPERATURE REGULATION: 0
NIGHT SWEATS: 0
PANIC: 0
PARALYSIS: 0
VOMITING: 0
POLYDIPSIA: 0
DECREASED CONCENTRATION: 0
FEVER: 0
DYSPNEA ON EXERTION: 0
BREAST MASS: 0
BREAST PAIN: 0
DECREASED APPETITE: 0
BACK PAIN: 0
POLYPHAGIA: 0
DEPRESSION: 0
COUGH: 0
EYE WATERING: 0
INSOMNIA: 0
FATIGUE: 0
ARTHRALGIAS: 0
ABDOMINAL PAIN: 0
CONSTIPATION: 0

## 2019-03-02 ASSESSMENT — ANXIETY QUESTIONNAIRES: GAD7 TOTAL SCORE: 0

## 2019-03-03 LAB — METHYLMALONATE SERPL-SCNC: 0.2 UMOL/L (ref 0–0.4)

## 2020-01-16 DIAGNOSIS — B02.9 HERPES ZOSTER WITHOUT COMPLICATION: Primary | ICD-10-CM

## 2020-01-16 RX ORDER — VALACYCLOVIR HYDROCHLORIDE 1 G/1
1000 TABLET, FILM COATED ORAL 3 TIMES DAILY
Qty: 21 TABLET | Refills: 1 | Status: SHIPPED | OUTPATIENT
Start: 2020-01-16 | End: 2020-05-31

## 2020-02-24 ENCOUNTER — HEALTH MAINTENANCE LETTER (OUTPATIENT)
Age: 45
End: 2020-02-24

## 2020-05-31 DIAGNOSIS — B02.9 HERPES ZOSTER WITHOUT COMPLICATION: ICD-10-CM

## 2020-05-31 RX ORDER — VALACYCLOVIR HYDROCHLORIDE 1 G/1
1000 TABLET, FILM COATED ORAL 3 TIMES DAILY
Qty: 21 TABLET | Refills: 4 | Status: SHIPPED | OUTPATIENT
Start: 2020-05-31

## 2020-12-13 ENCOUNTER — HEALTH MAINTENANCE LETTER (OUTPATIENT)
Age: 45
End: 2020-12-13

## 2021-01-18 ENCOUNTER — TELEPHONE (OUTPATIENT)
Dept: OBGYN | Facility: CLINIC | Age: 46
End: 2021-01-18

## 2021-01-18 DIAGNOSIS — T50.Z95S ADVERSE EFFECT OF VACCINE, SEQUELA: Primary | ICD-10-CM

## 2021-01-18 RX ORDER — ASPIRIN 300 MG/1
300 SUPPOSITORY RECTAL EVERY 6 HOURS PRN
Qty: 24 SUPPOSITORY | Refills: 0 | Status: SHIPPED | OUTPATIENT
Start: 2021-01-18

## 2021-01-18 RX ORDER — ACETAMINOPHEN 650 MG/1
650 SUPPOSITORY RECTAL EVERY 4 HOURS PRN
Qty: 30 SUPPOSITORY | Refills: 0 | Status: SHIPPED | OUTPATIENT
Start: 2021-01-18

## 2021-01-18 RX ORDER — ESOMEPRAZOLE MAGNESIUM 40 MG/1
40 CAPSULE, DELAYED RELEASE ORAL
Qty: 30 CAPSULE | Refills: 0 | Status: SHIPPED | OUTPATIENT
Start: 2021-01-18

## 2021-01-18 RX ORDER — SUCRALFATE 1 G/1
1 TABLET ORAL 4 TIMES DAILY
Qty: 20 TABLET | Refills: 0 | Status: SHIPPED | OUTPATIENT
Start: 2021-01-18

## 2021-04-17 ENCOUNTER — HEALTH MAINTENANCE LETTER (OUTPATIENT)
Age: 46
End: 2021-04-17

## 2021-05-09 ENCOUNTER — TELEPHONE (OUTPATIENT)
Dept: INTERNAL MEDICINE | Facility: CLINIC | Age: 46
End: 2021-05-09

## 2021-05-09 DIAGNOSIS — R23.4 FISSURE OF SKIN: Primary | ICD-10-CM

## 2021-05-09 RX ORDER — MUPIROCIN 20 MG/G
OINTMENT TOPICAL 3 TIMES DAILY
Qty: 30 G | Refills: 4 | Status: SHIPPED | OUTPATIENT
Start: 2021-05-09 | End: 2022-07-26

## 2021-05-09 NOTE — TELEPHONE ENCOUNTER
Patient reported fissure of the gluteal cleaft due to tight-fitting underwear during sport activity. Recommend mupirocin ointment and management of excessive moisture (wicking clothing).     Diana Munoz MD

## 2021-05-11 DIAGNOSIS — L03.317 CELLULITIS OF BUTTOCK: Primary | ICD-10-CM

## 2021-05-11 RX ORDER — CEPHALEXIN 500 MG/1
500 CAPSULE ORAL 4 TIMES DAILY
Qty: 28 CAPSULE | Refills: 0 | Status: SHIPPED | OUTPATIENT
Start: 2021-05-11 | End: 2021-05-18

## 2021-08-03 DIAGNOSIS — B37.31 YEAST INFECTION OF THE VAGINA: Primary | ICD-10-CM

## 2021-08-03 RX ORDER — FLUCONAZOLE 150 MG/1
150 TABLET ORAL ONCE
Qty: 2 TABLET | Refills: 1 | Status: SHIPPED | OUTPATIENT
Start: 2021-08-03 | End: 2021-08-03

## 2021-09-26 ENCOUNTER — HEALTH MAINTENANCE LETTER (OUTPATIENT)
Age: 46
End: 2021-09-26

## 2022-05-08 ENCOUNTER — HEALTH MAINTENANCE LETTER (OUTPATIENT)
Age: 47
End: 2022-05-08

## 2022-07-03 ENCOUNTER — HEALTH MAINTENANCE LETTER (OUTPATIENT)
Age: 47
End: 2022-07-03

## 2022-07-26 DIAGNOSIS — R23.4 FISSURE OF SKIN: ICD-10-CM

## 2022-07-26 RX ORDER — MUPIROCIN 20 MG/G
OINTMENT TOPICAL
Qty: 30 G | Refills: 4 | Status: SHIPPED | OUTPATIENT
Start: 2022-07-26

## 2023-01-14 ENCOUNTER — HEALTH MAINTENANCE LETTER (OUTPATIENT)
Age: 48
End: 2023-01-14

## 2023-03-01 DIAGNOSIS — B36.0 TINEA VERSICOLOR: Primary | ICD-10-CM

## 2023-03-01 RX ORDER — KETOCONAZOLE 20 MG/ML
SHAMPOO TOPICAL DAILY PRN
Qty: 120 ML | Refills: 4 | Status: SHIPPED | OUTPATIENT
Start: 2023-03-01

## 2023-03-01 RX ORDER — ITRACONAZOLE 100 MG/1
200 CAPSULE ORAL DAILY
Qty: 10 CAPSULE | Refills: 0 | Status: SHIPPED | OUTPATIENT
Start: 2023-03-01 | End: 2023-03-06

## 2023-03-01 RX ORDER — PRENATAL VIT 91/IRON/FOLIC/DHA 28-975-200
COMBINATION PACKAGE (EA) ORAL 2 TIMES DAILY
Qty: 42 G | Refills: 4 | Status: SHIPPED | OUTPATIENT
Start: 2023-03-01

## 2023-06-02 ENCOUNTER — HEALTH MAINTENANCE LETTER (OUTPATIENT)
Age: 48
End: 2023-06-02

## 2023-07-16 ENCOUNTER — HEALTH MAINTENANCE LETTER (OUTPATIENT)
Age: 48
End: 2023-07-16

## 2024-03-07 DIAGNOSIS — B36.0 TINEA VERSICOLOR: ICD-10-CM

## 2024-03-14 RX ORDER — PRENATAL VIT 91/IRON/FOLIC/DHA 28-975-200
COMBINATION PACKAGE (EA) ORAL 2 TIMES DAILY
Qty: 45 G | OUTPATIENT
Start: 2024-03-14

## 2024-03-14 NOTE — TELEPHONE ENCOUNTER
TERBINAFINE 1% CREAM 15GM      Last Written Prescription Date:  3/1/23  Last Fill Quantity: 42g,   # refills: 4  Last Office Visit : 3/1/19  Future Office visit:  none    Routing refill request to provider for review/approval because:  Drug not on the [facility], [facility] or [facility] refill protocol   Overdue for appointment

## 2024-06-30 ENCOUNTER — HEALTH MAINTENANCE LETTER (OUTPATIENT)
Age: 49
End: 2024-06-30

## 2024-10-02 DIAGNOSIS — B36.0 TINEA VERSICOLOR: ICD-10-CM

## 2024-10-08 RX ORDER — KETOCONAZOLE 20 MG/ML
SHAMPOO, SUSPENSION TOPICAL DAILY PRN
Qty: 120 ML | Refills: 4 | OUTPATIENT
Start: 2024-10-08

## 2025-07-13 ENCOUNTER — HEALTH MAINTENANCE LETTER (OUTPATIENT)
Age: 50
End: 2025-07-13

## 2025-08-03 ENCOUNTER — HEALTH MAINTENANCE LETTER (OUTPATIENT)
Age: 50
End: 2025-08-03